# Patient Record
Sex: MALE | Race: ASIAN | NOT HISPANIC OR LATINO
[De-identification: names, ages, dates, MRNs, and addresses within clinical notes are randomized per-mention and may not be internally consistent; named-entity substitution may affect disease eponyms.]

---

## 2020-10-22 ENCOUNTER — APPOINTMENT (OUTPATIENT)
Dept: CARDIOLOGY | Facility: CLINIC | Age: 65
End: 2020-10-22
Payer: MEDICARE

## 2020-10-22 ENCOUNTER — NON-APPOINTMENT (OUTPATIENT)
Age: 65
End: 2020-10-22

## 2020-10-22 VITALS
SYSTOLIC BLOOD PRESSURE: 129 MMHG | TEMPERATURE: 97.9 F | RESPIRATION RATE: 17 BRPM | BODY MASS INDEX: 28 KG/M2 | DIASTOLIC BLOOD PRESSURE: 79 MMHG | OXYGEN SATURATION: 98 % | WEIGHT: 158 LBS | HEIGHT: 63 IN | HEART RATE: 69 BPM

## 2020-10-22 PROCEDURE — 99204 OFFICE O/P NEW MOD 45 MIN: CPT

## 2020-10-22 PROCEDURE — 93000 ELECTROCARDIOGRAM COMPLETE: CPT

## 2020-10-22 RX ORDER — PANTOPRAZOLE SODIUM 40 MG/1
40 GRANULE, DELAYED RELEASE ORAL
Refills: 0 | Status: ACTIVE | COMMUNITY
Start: 2020-10-22

## 2020-10-22 RX ORDER — SIMVASTATIN 10 MG/1
10 TABLET, FILM COATED ORAL
Refills: 0 | Status: ACTIVE | COMMUNITY
Start: 2020-10-22

## 2020-10-22 RX ORDER — MYCOPHENOLATE MOFETIL 250 MG/1
250 CAPSULE ORAL
Refills: 0 | Status: ACTIVE | COMMUNITY
Start: 2020-10-22

## 2020-10-22 RX ORDER — TACROLIMUS 0.5 MG/1
0.5 CAPSULE ORAL
Refills: 0 | Status: ACTIVE | COMMUNITY
Start: 2020-10-22

## 2020-10-22 RX ORDER — METOPROLOL TARTRATE 25 MG/1
25 TABLET, FILM COATED ORAL
Refills: 0 | Status: ACTIVE | COMMUNITY
Start: 2020-10-22

## 2020-10-22 RX ORDER — TENOFOVIR DISOPROXIL FUMARATE 300 MG/1
300 TABLET, COATED ORAL
Refills: 0 | Status: ACTIVE | COMMUNITY
Start: 2020-10-22

## 2020-10-22 NOTE — REVIEW OF SYSTEMS
[see HPI] : see HPI [Shortness Of Breath] : shortness of breath [Dyspnea on exertion] : dyspnea during exertion [Chest  Pressure] : no chest pressure [Chest Pain] : chest pain [Palpitations] : no palpitations [Negative] : Heme/Lymph

## 2020-10-22 NOTE — DISCUSSION/SUMMARY
[FreeTextEntry1] : 65 M HTN hyperlipidemia CAD PCI with SOB.\par CHECK ECHOCARDIOGRAM.\par CHECK NST TO ASSESS PERFUSION.\par Continue medications for HTN and hyperlipidemia.\par Symptom monitoring and social distancing.

## 2020-10-22 NOTE — REASON FOR VISIT
[Follow-Up - Clinic] : a clinic follow-up of [Coronary Artery Disease] : coronary artery disease [Hyperlipidemia] : hyperlipidemia [Hypertension] : hypertension [FreeTextEntry1] : 65 M HTN hyperlipidemia CAD s/p PCI 2014 of LAD and RCA with CP and SOB.\par

## 2020-10-22 NOTE — HISTORY OF PRESENT ILLNESS
[FreeTextEntry1] :  * PATIENT HAS NOT SEEN ME IN 4 YEARS*\par 1. CAD s/p PCI: He underwent cardiac cath at [facility] and was found to have both RCA and LAD stenosis in 6/2015. He underwent PCI and of his LAD.  \par 2. HTN: on medications, well-controlled. No SE noted.\par 3. Hyperlipidemia: on statin, no muscle pain. \par Patient has noted worsening SOB with exertion over the last 3 months. SOB is severe, limits his ET and is relieved with rest. He has noted that the symptoms last minutes and are relieved with rest and occur more frequently on a daily basis.

## 2020-10-22 NOTE — PHYSICAL EXAM
[General Appearance - Well Developed] : well developed [Normal Appearance] : normal appearance [Well Groomed] : well groomed [General Appearance - Well Nourished] : well nourished [No Deformities] : no deformities [General Appearance - In No Acute Distress] : no acute distress [Normal Conjunctiva] : the conjunctiva exhibited no abnormalities [Eyelids - No Xanthelasma] : the eyelids demonstrated no xanthelasmas [Normal Oral Mucosa] : normal oral mucosa [No Oral Pallor] : no oral pallor [No Oral Cyanosis] : no oral cyanosis [Normal Jugular Venous A Waves Present] : normal jugular venous A waves present [Normal Jugular Venous V Waves Present] : normal jugular venous V waves present [No Jugular Venous Jansen A Waves] : no jugular venous jansen A waves [Respiration, Rhythm And Depth] : normal respiratory rhythm and effort [Exaggerated Use Of Accessory Muscles For Inspiration] : no accessory muscle use [Auscultation Breath Sounds / Voice Sounds] : lungs were clear to auscultation bilaterally [Heart Rate And Rhythm] : heart rate and rhythm were normal [Heart Sounds] : normal S1 and S2 [Murmurs] : no murmurs present [Abdomen Soft] : soft [Abdomen Tenderness] : non-tender [Abdomen Mass (___ Cm)] : no abdominal mass palpated [Abnormal Walk] : normal gait [Gait - Sufficient For Exercise Testing] : the gait was sufficient for exercise testing [Nail Clubbing] : no clubbing of the fingernails [Cyanosis, Localized] : no localized cyanosis [Petechial Hemorrhages (___cm)] : no petechial hemorrhages [Skin Color & Pigmentation] : normal skin color and pigmentation [] : no rash [No Venous Stasis] : no venous stasis [Skin Lesions] : no skin lesions [No Skin Ulcers] : no skin ulcer [No Xanthoma] : no  xanthoma was observed [Oriented To Time, Place, And Person] : oriented to person, place, and time [Affect] : the affect was normal [Mood] : the mood was normal [No Anxiety] : not feeling anxious

## 2020-11-03 ENCOUNTER — APPOINTMENT (OUTPATIENT)
Dept: CARDIOLOGY | Facility: CLINIC | Age: 65
End: 2020-11-03
Payer: MEDICARE

## 2020-11-03 VITALS
SYSTOLIC BLOOD PRESSURE: 111 MMHG | WEIGHT: 158 LBS | TEMPERATURE: 97.2 F | OXYGEN SATURATION: 98 % | HEART RATE: 81 BPM | BODY MASS INDEX: 27.99 KG/M2 | DIASTOLIC BLOOD PRESSURE: 74 MMHG | RESPIRATION RATE: 18 BRPM

## 2020-11-03 DIAGNOSIS — R07.9 CHEST PAIN, UNSPECIFIED: ICD-10-CM

## 2020-11-03 DIAGNOSIS — R06.02 SHORTNESS OF BREATH: ICD-10-CM

## 2020-11-03 PROCEDURE — A9500: CPT

## 2020-11-03 PROCEDURE — 93306 TTE W/DOPPLER COMPLETE: CPT

## 2020-11-03 PROCEDURE — 93015 CV STRESS TEST SUPVJ I&R: CPT

## 2020-11-03 PROCEDURE — 78452 HT MUSCLE IMAGE SPECT MULT: CPT

## 2020-11-03 PROCEDURE — 99072 ADDL SUPL MATRL&STAF TM PHE: CPT

## 2020-11-09 ENCOUNTER — APPOINTMENT (OUTPATIENT)
Dept: CARDIOLOGY | Facility: CLINIC | Age: 65
End: 2020-11-09
Payer: MEDICARE

## 2020-11-09 VITALS
SYSTOLIC BLOOD PRESSURE: 124 MMHG | RESPIRATION RATE: 18 BRPM | DIASTOLIC BLOOD PRESSURE: 79 MMHG | HEART RATE: 60 BPM | TEMPERATURE: 98 F | WEIGHT: 158 LBS | BODY MASS INDEX: 27.99 KG/M2 | OXYGEN SATURATION: 98 %

## 2020-11-09 PROCEDURE — 99072 ADDL SUPL MATRL&STAF TM PHE: CPT

## 2020-11-09 PROCEDURE — 99214 OFFICE O/P EST MOD 30 MIN: CPT

## 2020-11-09 RX ORDER — NITROGLYCERIN 0.4 MG/1
0.4 TABLET SUBLINGUAL
Qty: 30 | Refills: 2 | Status: ACTIVE | COMMUNITY
Start: 2020-11-09 | End: 1900-01-01

## 2020-11-09 NOTE — REASON FOR VISIT
[Follow-Up - Clinic] : a clinic follow-up of [Chest Pain] : chest pain [Coronary Artery Disease] : coronary artery disease [Dyspnea] : dyspnea [Hyperlipidemia] : hyperlipidemia [Hypertension] : hypertension [FreeTextEntry1] : 65 M HTN hyperlipidemia CAD s/p PCI 2014 of LAD and RCA with CP and SOB.\par

## 2020-11-09 NOTE — HISTORY OF PRESENT ILLNESS
[FreeTextEntry1] :  \par 1. CAD s/p PCI: He underwent cardiac cath at [facility] and was found to have both RCA and LAD stenosis in 6/2015. He underwent PCI and of his LAD. \par 2. HTN: on medications, well-controlled. No SE noted.\par 3. Hyperlipidemia: on statin, no muscle pain. \par He has had worsening CP and SOB with exertion over the last several months. He underwent an echo and NST. Echo showed normal LVEF. NST shows mild to moderate ischemia in anterior and apical walls. He continues to have CP.\par

## 2020-11-09 NOTE — DISCUSSION/SUMMARY
[FreeTextEntry1] : 65 M HTN hyperlipidemia CAD s/p PCI with CP and abnormal NST.\par REFER FOR CARDIAC CATHETERIZATION STAT.\par Continue medications for HTN.\par Continue statin.\par NG prn for CP given.

## 2020-11-17 ENCOUNTER — APPOINTMENT (OUTPATIENT)
Dept: DISASTER EMERGENCY | Facility: CLINIC | Age: 65
End: 2020-11-17

## 2020-11-17 DIAGNOSIS — Z01.818 ENCOUNTER FOR OTHER PREPROCEDURAL EXAMINATION: ICD-10-CM

## 2020-11-18 VITALS
HEART RATE: 51 BPM | TEMPERATURE: 97 F | OXYGEN SATURATION: 100 % | DIASTOLIC BLOOD PRESSURE: 70 MMHG | SYSTOLIC BLOOD PRESSURE: 147 MMHG | RESPIRATION RATE: 16 BRPM

## 2020-11-18 LAB — SARS-COV-2 N GENE NPH QL NAA+PROBE: NOT DETECTED

## 2020-11-18 RX ORDER — ASCORBIC ACID 60 MG
1 TABLET,CHEWABLE ORAL
Qty: 0 | Refills: 0 | DISCHARGE

## 2020-11-18 RX ORDER — CHLORHEXIDINE GLUCONATE 213 G/1000ML
1 SOLUTION TOPICAL ONCE
Refills: 0 | Status: DISCONTINUED | OUTPATIENT
Start: 2020-11-20 | End: 2020-11-20

## 2020-11-18 NOTE — H&P ADULT - ATTENDING COMMENTS
PLease see housestaff note for full details. I have reviewed the case, examined the patient and agree with plan.  For cardiac cath today.

## 2020-11-18 NOTE — H&P ADULT - ASSESSMENT
64 y/o male with PMH of HTN, DM, HLD, CAD (s/p DORA to RCA, LAD), Hepatitis B/cirrhosis with Liver Ca (s/p Liver transplant 1/2018, s/p hepatic artery stent, ?chemotherapy) who presents to [facility] for recommended cardiac catheterization with possible intervention if clinically indicated, in light of pts risk factors, CCS class IV anginal symptoms and abnormal NST.    -ASA 3, Mallampati 3  -Pt compliant with home ASA 81mg, last dose this AM. Will load pt with Plavix 600mg prior to cath  -IVF Hydration NS @ 75cc/hr  -pre cath consented via [facility]  #[id number]    Risks & benefits of procedure and alternative therapy have been explained to the patient including but not limited to: allergic reaction, bleeding w/possible need for blood transfusion, infection, renal and vascular compromise, limb damage, arrhythmia, stroke, vessel dissection/perforation, Myocardial infarction, emergent CABG. Informed consent obtained and in chart.

## 2020-11-18 NOTE — H&P ADULT - NSICDXPASTMEDICALHX_GEN_ALL_CORE_FT
PAST MEDICAL HISTORY:  Atherosclerosis of coronary artery CAD (coronary artery disease)    Essential hypertension HTN (hypertension)    Hepatic cirrhosis due to hepatitis B     Hepatocellular carcinoma s/p Liver tansplant in 2018    Hyperlipidemia Hyperlipidemia

## 2020-11-18 NOTE — H&P ADULT - HISTORY OF PRESENT ILLNESS
COVID Negative in HIE  Pharmacy: [facility] [address] [place]   Cardiologist: Dr. Corado   Escort: need transport     Hx obtained via [facility]  # [id number]  66 y/o male with PMH of HTN, DM, HLD, CAD (s/p DORA to RCA, LAD), Hepatitis B/cirrhosis with Liver Ca (s/p Liver transplant 1/2018 ?chemotherapy) who presented to cardiologist Dr. Corado endorsing worsening shortness of breath with exertion of < 1 block that started 1 year ago  shortness of breath resolves with 5 minutes, denies shortness of breath with rest. Patient denies active chest pain, palpitations, diaphoresis, N/V fatigue, dizziness, syncope, orthopnea, positional nocturnal dyspnea, recent fever, and chills,   patient endorses Le edema with sitting for extended periods of time.  NST on 11/3/20 small mild defects in apical, distal anterior, distal inferior, distal lateral distal septal wall that are reversible suggestive of mild-moderate od distalLAD ischemia EF 68% Echo 11/3/20 minimal MR, Normal LV dimensions and wall thickness, grossly normal LV systolic function EF 65-70%, minimal TR, borderline pulmHTN PASP 37mmHg.    In light of patients risk factors, CCS class IV anginal equivalent symptoms, known CAD and abnormal NST the patient is now refferred for cardiac catheterization with possible intervention.     Cath history:  5/20/2015 @ [facility], DORA to proxRCA (90%) with aneurysm beyond the lesion LM normal, midLAD 75%, distalLCx prior stent patent ED 55%  6/26/2015 @ [facility] DORA to proxLAD (80%), midLCx 40%, LM normal, proxRCA stent patent COVID Negative in HIE  Ph[facility]   Cardiologist: Dr. Corado     Hx obtained via [facility]  # [id number]  66 y/o male with PMH of HTN, DM, HLD, CAD (s/p DORA to RCA, LAD), Hepatitis B/cirrhosis with Liver Ca (s/p Liver transplant 1/2018, s/p hepatic artery stent, ?chemotherapy) who presented to cardiologist Dr. Corado endorsing worsening shortness of breath with exertion of < 1 block that started 1 year ago  shortness of breath resolves with 5 minutes, denies shortness of breath with rest. Patient denies active chest pain, palpitations, diaphoresis, N/V fatigue, dizziness, syncope, orthopnea, positional nocturnal dyspnea, recent fever, and chills,   patient endorses Le edema with sitting for extended periods of time.  NST on 11/3/20 small mild defects in apical, distal anterior, distal inferior, distal lateral distal septal wall that are reversible suggestive of mild-moderate od distalLAD ischemia EF 68% Echo 11/3/20 minimal MR, Normal LV dimensions and wall thickness, grossly normal LV systolic function EF 65-70%, minimal TR, borderline pulmHTN PASP 37mmHg.    In light of patients risk factors, CCS class IV anginal equivalent symptoms, known CAD and abnormal NST the patient is now refferred for cardiac catheterization with possible intervention.     Cath history:  5/20/2015 @ [facility], DORA to proxRCA (90%) with aneurysm beyond the lesion LM normal, midLAD 75%, distalLCx prior stent patent ED 55%  6/26/2015 @ [facility] DORA to proxLAD (80%), midLCx 40%, LM normal, proxRCA stent patent

## 2020-11-20 ENCOUNTER — INPATIENT (INPATIENT)
Facility: HOSPITAL | Age: 65
LOS: 0 days | Discharge: HOME | DRG: 251 | End: 2020-11-21
Attending: INTERNAL MEDICINE | Admitting: INTERNAL MEDICINE
Payer: MEDICARE

## 2020-11-20 ENCOUNTER — TRANSCRIPTION ENCOUNTER (OUTPATIENT)
Age: 65
End: 2020-11-20

## 2020-11-20 DIAGNOSIS — Z94.4 LIVER TRANSPLANT STATUS: Chronic | ICD-10-CM

## 2020-11-20 LAB
A1C WITH ESTIMATED AVERAGE GLUCOSE RESULT: 6.8 % — HIGH (ref 4–5.6)
ALBUMIN SERPL ELPH-MCNC: 4.9 G/DL — SIGNIFICANT CHANGE UP (ref 3.3–5)
ALP SERPL-CCNC: 70 U/L — SIGNIFICANT CHANGE UP (ref 40–120)
ALT FLD-CCNC: 28 U/L — SIGNIFICANT CHANGE UP (ref 10–45)
ANION GAP SERPL CALC-SCNC: 11 MMOL/L — SIGNIFICANT CHANGE UP (ref 5–17)
APTT BLD: 35.9 SEC — HIGH (ref 27.5–35.5)
AST SERPL-CCNC: 23 U/L — SIGNIFICANT CHANGE UP (ref 10–40)
BASOPHILS # BLD AUTO: 0.02 K/UL — SIGNIFICANT CHANGE UP (ref 0–0.2)
BASOPHILS NFR BLD AUTO: 0.4 % — SIGNIFICANT CHANGE UP (ref 0–2)
BILIRUB SERPL-MCNC: 0.6 MG/DL — SIGNIFICANT CHANGE UP (ref 0.2–1.2)
BUN SERPL-MCNC: 16 MG/DL — SIGNIFICANT CHANGE UP (ref 7–23)
CALCIUM SERPL-MCNC: 9.8 MG/DL — SIGNIFICANT CHANGE UP (ref 8.4–10.5)
CHLORIDE SERPL-SCNC: 98 MMOL/L — SIGNIFICANT CHANGE UP (ref 96–108)
CHOLEST SERPL-MCNC: 114 MG/DL — SIGNIFICANT CHANGE UP
CK MB CFR SERPL CALC: 1.4 NG/ML — SIGNIFICANT CHANGE UP (ref 0–6.7)
CK SERPL-CCNC: 118 U/L — SIGNIFICANT CHANGE UP (ref 30–200)
CO2 SERPL-SCNC: 28 MMOL/L — SIGNIFICANT CHANGE UP (ref 22–31)
CREAT SERPL-MCNC: 1.15 MG/DL — SIGNIFICANT CHANGE UP (ref 0.5–1.3)
EOSINOPHIL # BLD AUTO: 0.05 K/UL — SIGNIFICANT CHANGE UP (ref 0–0.5)
EOSINOPHIL NFR BLD AUTO: 0.9 % — SIGNIFICANT CHANGE UP (ref 0–6)
ESTIMATED AVERAGE GLUCOSE: 148 MG/DL — HIGH (ref 68–114)
GLUCOSE BLDC GLUCOMTR-MCNC: 115 MG/DL — HIGH (ref 70–99)
GLUCOSE SERPL-MCNC: 109 MG/DL — HIGH (ref 70–99)
HCT VFR BLD CALC: 40.2 % — SIGNIFICANT CHANGE UP (ref 39–50)
HDLC SERPL-MCNC: 22 MG/DL — LOW
HGB BLD-MCNC: 12.3 G/DL — LOW (ref 13–17)
IMM GRANULOCYTES NFR BLD AUTO: 0.2 % — SIGNIFICANT CHANGE UP (ref 0–1.5)
INR BLD: 1.19 — HIGH (ref 0.88–1.16)
LIPID PNL WITH DIRECT LDL SERPL: 44 MG/DL — SIGNIFICANT CHANGE UP
LYMPHOCYTES # BLD AUTO: 0.99 K/UL — LOW (ref 1–3.3)
LYMPHOCYTES # BLD AUTO: 18.3 % — SIGNIFICANT CHANGE UP (ref 13–44)
MCHC RBC-ENTMCNC: 25.1 PG — LOW (ref 27–34)
MCHC RBC-ENTMCNC: 30.6 GM/DL — LOW (ref 32–36)
MCV RBC AUTO: 82 FL — SIGNIFICANT CHANGE UP (ref 80–100)
MONOCYTES # BLD AUTO: 0.55 K/UL — SIGNIFICANT CHANGE UP (ref 0–0.9)
MONOCYTES NFR BLD AUTO: 10.1 % — SIGNIFICANT CHANGE UP (ref 2–14)
NEUTROPHILS # BLD AUTO: 3.8 K/UL — SIGNIFICANT CHANGE UP (ref 1.8–7.4)
NEUTROPHILS NFR BLD AUTO: 70.1 % — SIGNIFICANT CHANGE UP (ref 43–77)
NON HDL CHOLESTEROL: 92 MG/DL — SIGNIFICANT CHANGE UP
NRBC # BLD: 0 /100 WBCS — SIGNIFICANT CHANGE UP (ref 0–0)
PLATELET # BLD AUTO: 159 K/UL — SIGNIFICANT CHANGE UP (ref 150–400)
POTASSIUM SERPL-MCNC: 4.3 MMOL/L — SIGNIFICANT CHANGE UP (ref 3.5–5.3)
POTASSIUM SERPL-SCNC: 4.3 MMOL/L — SIGNIFICANT CHANGE UP (ref 3.5–5.3)
PROT SERPL-MCNC: 7.6 G/DL — SIGNIFICANT CHANGE UP (ref 6–8.3)
PROTHROM AB SERPL-ACNC: 14.2 SEC — HIGH (ref 10.6–13.6)
RBC # BLD: 4.9 M/UL — SIGNIFICANT CHANGE UP (ref 4.2–5.8)
RBC # FLD: 16.2 % — HIGH (ref 10.3–14.5)
SODIUM SERPL-SCNC: 137 MMOL/L — SIGNIFICANT CHANGE UP (ref 135–145)
TRIGL SERPL-MCNC: 241 MG/DL — HIGH
WBC # BLD: 5.42 K/UL — SIGNIFICANT CHANGE UP (ref 3.8–10.5)
WBC # FLD AUTO: 5.42 K/UL — SIGNIFICANT CHANGE UP (ref 3.8–10.5)

## 2020-11-20 PROCEDURE — 93454 CORONARY ARTERY ANGIO S&I: CPT | Mod: 26,59

## 2020-11-20 PROCEDURE — 92920 PRQ TRLUML C ANGIOP 1ART&/BR: CPT | Mod: LD

## 2020-11-20 PROCEDURE — 99222 1ST HOSP IP/OBS MODERATE 55: CPT

## 2020-11-20 RX ORDER — MYCOPHENOLATE MOFETIL 250 MG/1
250 CAPSULE ORAL
Refills: 0 | Status: DISCONTINUED | OUTPATIENT
Start: 2020-11-20 | End: 2020-11-21

## 2020-11-20 RX ORDER — SODIUM CHLORIDE 9 MG/ML
500 INJECTION INTRAMUSCULAR; INTRAVENOUS; SUBCUTANEOUS
Refills: 0 | Status: DISCONTINUED | OUTPATIENT
Start: 2020-11-20 | End: 2020-11-20

## 2020-11-20 RX ORDER — SIMVASTATIN 20 MG/1
1 TABLET, FILM COATED ORAL
Qty: 0 | Refills: 0 | DISCHARGE

## 2020-11-20 RX ORDER — SODIUM CHLORIDE 9 MG/ML
500 INJECTION INTRAMUSCULAR; INTRAVENOUS; SUBCUTANEOUS
Refills: 0 | Status: DISCONTINUED | OUTPATIENT
Start: 2020-11-20 | End: 2020-11-21

## 2020-11-20 RX ORDER — CHOLECALCIFEROL (VITAMIN D3) 125 MCG
1 CAPSULE ORAL
Qty: 0 | Refills: 0 | DISCHARGE

## 2020-11-20 RX ORDER — SPIRONOLACTONE 25 MG/1
1 TABLET, FILM COATED ORAL
Qty: 0 | Refills: 0 | DISCHARGE

## 2020-11-20 RX ORDER — MULTIVIT-MIN/FERROUS GLUCONATE 9 MG/15 ML
1 LIQUID (ML) ORAL
Qty: 0 | Refills: 0 | DISCHARGE

## 2020-11-20 RX ORDER — CLOPIDOGREL BISULFATE 75 MG/1
75 TABLET, FILM COATED ORAL DAILY
Refills: 0 | Status: DISCONTINUED | OUTPATIENT
Start: 2020-11-21 | End: 2020-11-21

## 2020-11-20 RX ORDER — PANTOPRAZOLE SODIUM 20 MG/1
1 TABLET, DELAYED RELEASE ORAL
Qty: 0 | Refills: 0 | DISCHARGE

## 2020-11-20 RX ORDER — METOPROLOL TARTRATE 50 MG
1 TABLET ORAL
Qty: 0 | Refills: 0 | DISCHARGE

## 2020-11-20 RX ORDER — CLOPIDOGREL BISULFATE 75 MG/1
600 TABLET, FILM COATED ORAL ONCE
Refills: 0 | Status: COMPLETED | OUTPATIENT
Start: 2020-11-20 | End: 2020-11-20

## 2020-11-20 RX ORDER — METOPROLOL TARTRATE 50 MG
25 TABLET ORAL
Refills: 0 | Status: DISCONTINUED | OUTPATIENT
Start: 2020-11-20 | End: 2020-11-21

## 2020-11-20 RX ORDER — TENOFOVIR DISOPROXIL FUMARATE 300 MG/1
1 TABLET, FILM COATED ORAL
Qty: 0 | Refills: 0 | DISCHARGE

## 2020-11-20 RX ORDER — ICOSAPENT ETHYL 500 MG/1
2 CAPSULE, LIQUID FILLED ORAL
Qty: 0 | Refills: 0 | DISCHARGE

## 2020-11-20 RX ORDER — PANTOPRAZOLE SODIUM 20 MG/1
40 TABLET, DELAYED RELEASE ORAL
Refills: 0 | Status: DISCONTINUED | OUTPATIENT
Start: 2020-11-20 | End: 2020-11-21

## 2020-11-20 RX ORDER — MYCOPHENOLATE MOFETIL 250 MG/1
1 CAPSULE ORAL
Qty: 0 | Refills: 0 | DISCHARGE

## 2020-11-20 RX ORDER — TACROLIMUS 5 MG/1
0.5 CAPSULE ORAL EVERY 12 HOURS
Refills: 0 | Status: DISCONTINUED | OUTPATIENT
Start: 2020-11-20 | End: 2020-11-21

## 2020-11-20 RX ORDER — ASCORBIC ACID 60 MG
1 TABLET,CHEWABLE ORAL
Qty: 0 | Refills: 0 | DISCHARGE

## 2020-11-20 RX ORDER — SIMVASTATIN 20 MG/1
10 TABLET, FILM COATED ORAL AT BEDTIME
Refills: 0 | Status: DISCONTINUED | OUTPATIENT
Start: 2020-11-20 | End: 2020-11-21

## 2020-11-20 RX ORDER — CLOPIDOGREL BISULFATE 75 MG/1
1 TABLET, FILM COATED ORAL
Qty: 0 | Refills: 0 | DISCHARGE

## 2020-11-20 RX ORDER — MYCOPHENOLATE MOFETIL 250 MG/1
4 CAPSULE ORAL
Qty: 0 | Refills: 0 | DISCHARGE

## 2020-11-20 RX ORDER — TACROLIMUS 5 MG/1
1 CAPSULE ORAL
Qty: 0 | Refills: 0 | DISCHARGE

## 2020-11-20 RX ORDER — ASPIRIN/CALCIUM CARB/MAGNESIUM 324 MG
81 TABLET ORAL DAILY
Refills: 0 | Status: DISCONTINUED | OUTPATIENT
Start: 2020-11-21 | End: 2020-11-21

## 2020-11-20 RX ORDER — TENOFOVIR DISOPROXIL FUMARATE 300 MG/1
300 TABLET, FILM COATED ORAL DAILY
Refills: 0 | Status: DISCONTINUED | OUTPATIENT
Start: 2020-11-21 | End: 2020-11-21

## 2020-11-20 RX ORDER — NADOLOL 80 MG/1
2 TABLET ORAL
Qty: 0 | Refills: 0 | DISCHARGE

## 2020-11-20 RX ORDER — UBIDECARENONE 100 MG
1 CAPSULE ORAL
Qty: 0 | Refills: 0 | DISCHARGE

## 2020-11-20 RX ADMIN — MYCOPHENOLATE MOFETIL 250 MILLIGRAM(S): 250 CAPSULE ORAL at 22:14

## 2020-11-20 RX ADMIN — CLOPIDOGREL BISULFATE 600 MILLIGRAM(S): 75 TABLET, FILM COATED ORAL at 12:27

## 2020-11-20 RX ADMIN — SODIUM CHLORIDE 75 MILLILITER(S): 9 INJECTION INTRAMUSCULAR; INTRAVENOUS; SUBCUTANEOUS at 12:27

## 2020-11-20 RX ADMIN — Medication 25 MILLIGRAM(S): at 22:14

## 2020-11-20 RX ADMIN — SIMVASTATIN 10 MILLIGRAM(S): 20 TABLET, FILM COATED ORAL at 22:14

## 2020-11-20 RX ADMIN — TACROLIMUS 0.5 MILLIGRAM(S): 5 CAPSULE ORAL at 22:14

## 2020-11-20 NOTE — CHART NOTE - NSCHARTNOTEFT_GEN_A_CORE
Indication for Catheterization: Unstable Angina. (+) Nuclear Stress Test  Indication for Admission: PCI of Unprotected Proximal LAD.    One Coronary Artery Disease  Syntax score low  Frailty score 4    Coronary Angiogram  LMCA: Normal  LAD: 60% instent restenosis of proxLAD  D1: Mild luminal irregularities  LCx: 30-50% stenosis of midLCx  OM1: Mild luminal irregularities  RCA: Large, dominant vessel with mild luminal irregularities    Left Heart Catheterization deferred    Intervention  - Successful PTCA of 60% stenosis of pLAD with a 3.5 x 15 mm Angiosculpt Balloon . 0% residual stenosis and excellent angiographic result with IZZY 3 flow post intervention.      Radial band placed on Right Radial access site with adequate hemostasis prior to leaving the cath lab  No procedural complications    Recommendations  Dual anti-platelet therapy with Aspirin 81mg daily and Plavix 75mg daily for at least 1 year after which Aspirin 81mg daily should be continued indefinitely  Optimal medical therapy as tolerated, including appropriate intensity statin, beta blocker and ACE/ARB if indicated  Risk factor modification and risk reduction strategies with lifestyle changes  Follow up with Dr Corado post discharge

## 2020-11-21 ENCOUNTER — TRANSCRIPTION ENCOUNTER (OUTPATIENT)
Age: 65
End: 2020-11-21

## 2020-11-21 VITALS — TEMPERATURE: 98 F

## 2020-11-21 LAB
ANION GAP SERPL CALC-SCNC: 12 MMOL/L — SIGNIFICANT CHANGE UP (ref 5–17)
BUN SERPL-MCNC: 16 MG/DL — SIGNIFICANT CHANGE UP (ref 7–23)
CALCIUM SERPL-MCNC: 8.9 MG/DL — SIGNIFICANT CHANGE UP (ref 8.4–10.5)
CHLORIDE SERPL-SCNC: 103 MMOL/L — SIGNIFICANT CHANGE UP (ref 96–108)
CO2 SERPL-SCNC: 23 MMOL/L — SIGNIFICANT CHANGE UP (ref 22–31)
CREAT SERPL-MCNC: 1.23 MG/DL — SIGNIFICANT CHANGE UP (ref 0.5–1.3)
GLUCOSE SERPL-MCNC: 142 MG/DL — HIGH (ref 70–99)
HCT VFR BLD CALC: 39 % — SIGNIFICANT CHANGE UP (ref 39–50)
HCV AB S/CO SERPL IA: 0.09 S/CO — SIGNIFICANT CHANGE UP
HCV AB SERPL-IMP: SIGNIFICANT CHANGE UP
HGB BLD-MCNC: 12.1 G/DL — LOW (ref 13–17)
MAGNESIUM SERPL-MCNC: 2.1 MG/DL — SIGNIFICANT CHANGE UP (ref 1.6–2.6)
MCHC RBC-ENTMCNC: 25.4 PG — LOW (ref 27–34)
MCHC RBC-ENTMCNC: 31 GM/DL — LOW (ref 32–36)
MCV RBC AUTO: 81.9 FL — SIGNIFICANT CHANGE UP (ref 80–100)
NRBC # BLD: 0 /100 WBCS — SIGNIFICANT CHANGE UP (ref 0–0)
PLATELET # BLD AUTO: 155 K/UL — SIGNIFICANT CHANGE UP (ref 150–400)
POTASSIUM SERPL-MCNC: 4.4 MMOL/L — SIGNIFICANT CHANGE UP (ref 3.5–5.3)
POTASSIUM SERPL-SCNC: 4.4 MMOL/L — SIGNIFICANT CHANGE UP (ref 3.5–5.3)
RBC # BLD: 4.76 M/UL — SIGNIFICANT CHANGE UP (ref 4.2–5.8)
RBC # FLD: 16.3 % — HIGH (ref 10.3–14.5)
SODIUM SERPL-SCNC: 138 MMOL/L — SIGNIFICANT CHANGE UP (ref 135–145)
WBC # BLD: 5.87 K/UL — SIGNIFICANT CHANGE UP (ref 3.8–10.5)
WBC # FLD AUTO: 5.87 K/UL — SIGNIFICANT CHANGE UP (ref 3.8–10.5)

## 2020-11-21 PROCEDURE — 82550 ASSAY OF CK (CPK): CPT

## 2020-11-21 PROCEDURE — 83735 ASSAY OF MAGNESIUM: CPT

## 2020-11-21 PROCEDURE — 99239 HOSP IP/OBS DSCHRG MGMT >30: CPT

## 2020-11-21 PROCEDURE — 80061 LIPID PANEL: CPT

## 2020-11-21 PROCEDURE — C1725: CPT

## 2020-11-21 PROCEDURE — 80053 COMPREHEN METABOLIC PANEL: CPT

## 2020-11-21 PROCEDURE — 85730 THROMBOPLASTIN TIME PARTIAL: CPT

## 2020-11-21 PROCEDURE — 36415 COLL VENOUS BLD VENIPUNCTURE: CPT

## 2020-11-21 PROCEDURE — 83036 HEMOGLOBIN GLYCOSYLATED A1C: CPT

## 2020-11-21 PROCEDURE — 85025 COMPLETE CBC W/AUTO DIFF WBC: CPT

## 2020-11-21 PROCEDURE — 80048 BASIC METABOLIC PNL TOTAL CA: CPT

## 2020-11-21 PROCEDURE — 86803 HEPATITIS C AB TEST: CPT

## 2020-11-21 PROCEDURE — 85027 COMPLETE CBC AUTOMATED: CPT

## 2020-11-21 PROCEDURE — 85610 PROTHROMBIN TIME: CPT

## 2020-11-21 PROCEDURE — 82553 CREATINE MB FRACTION: CPT

## 2020-11-21 PROCEDURE — 82962 GLUCOSE BLOOD TEST: CPT

## 2020-11-21 PROCEDURE — C1887: CPT

## 2020-11-21 PROCEDURE — C1769: CPT

## 2020-11-21 RX ORDER — ASPIRIN/CALCIUM CARB/MAGNESIUM 324 MG
1 TABLET ORAL
Qty: 0 | Refills: 0 | DISCHARGE

## 2020-11-21 RX ORDER — CLOPIDOGREL BISULFATE 75 MG/1
1 TABLET, FILM COATED ORAL
Qty: 30 | Refills: 4
Start: 2020-11-21 | End: 2021-04-19

## 2020-11-21 RX ORDER — ASPIRIN/CALCIUM CARB/MAGNESIUM 324 MG
1 TABLET ORAL
Qty: 30 | Refills: 3
Start: 2020-11-21 | End: 2021-03-20

## 2020-11-21 RX ADMIN — CLOPIDOGREL BISULFATE 75 MILLIGRAM(S): 75 TABLET, FILM COATED ORAL at 11:30

## 2020-11-21 RX ADMIN — MYCOPHENOLATE MOFETIL 250 MILLIGRAM(S): 250 CAPSULE ORAL at 11:29

## 2020-11-21 RX ADMIN — TACROLIMUS 0.5 MILLIGRAM(S): 5 CAPSULE ORAL at 11:30

## 2020-11-21 RX ADMIN — Medication 81 MILLIGRAM(S): at 11:30

## 2020-11-21 RX ADMIN — PANTOPRAZOLE SODIUM 40 MILLIGRAM(S): 20 TABLET, DELAYED RELEASE ORAL at 06:17

## 2020-11-21 RX ADMIN — TENOFOVIR DISOPROXIL FUMARATE 300 MILLIGRAM(S): 300 TABLET, FILM COATED ORAL at 11:29

## 2020-11-21 NOTE — DISCHARGE NOTE PROVIDER - NSDCFUADDAPPT_GEN_ALL_CORE_FT
1. You are to follow up with Dr. Corado on 11/30/2020. Please call his office for the time of your scheduled appointment for that day.

## 2020-11-21 NOTE — DISCHARGE NOTE PROVIDER - NSDCCPCAREPLAN_GEN_ALL_CORE_FT
PRINCIPAL DISCHARGE DIAGNOSIS  Diagnosis: CAD (coronary artery disease)  Assessment and Plan of Treatment: You had successful percutaneous coronary intervention with balloon angioplasty of proximal left anterior descending artery in-stent-restenosis.   --Continue Aspirin 81mg by mouth daily and Plavix 75mg by mouth daily.   --DO NOT STOP TAKING ASPIRIN OR PLAVIX UNLESS INSTRUCTED BY YOUR CARDIOLOGIST, TO PREVENT STENT CLOSURE/HEART ATTACK.   ***The catheter from your wrist was removed and you should remove the dressing in 24 hours.  *** You may shower once the dressing is removed, but avoid baths, hot tubs, or swimming for 5 days to prevent infection. If you notice bleeding from the site, hardening and pain at the site, drainage or redness from the site, coolness/paleness of the extremity, swelling, or fever, please call [phone].  *** All of your needed prescriptions have been sent electronically to your pharmacy.        SECONDARY DISCHARGE DIAGNOSES  Diagnosis: Hypertension  Assessment and Plan of Treatment: Continue Metoprolol Tartrate 25mg by mouth twice daily    Diagnosis: Hyperlipidemia  Assessment and Plan of Treatment: Continue Simvastatin 10mg by mouth at bedtime and Vascepa 1gram 2 capsules by mouth twice daily    Diagnosis: S/P liver transplant  Assessment and Plan of Treatment: Continue home regimen of Tacrolimus 0.5mg by mouth every 12hours, CellCept 250 mg by mouth twice daily    Diagnosis: Hepatitis B  Assessment and Plan of Treatment: Continue Tenofovir disoproxil fumarate 300 mg by mouth daily

## 2020-11-21 NOTE — DISCHARGE NOTE PROVIDER - CARE PROVIDER_API CALL
Bryce Corado)  Cardiovascular Disease; Internal Medicine  [address] [address]  Phone: [phone]  Fax: [phone]  Follow Up Time: 2 weeks   Bryce Corado)  Cardiovascular Disease; Internal Medicine  [address] [place] [address]  Phone: [phone]  Fax: [phone]  Scheduled Appointment: 11/30/2020

## 2020-11-21 NOTE — DISCHARGE NOTE PROVIDER - PROVIDER TOKENS
PROVIDER:[TOKEN:[1000:MIIS:1000],FOLLOWUP:[2 weeks]] PROVIDER:[TOKEN:[1000:MIIS:1000],SCHEDULEDAPPT:[11/30/2020]]

## 2020-11-21 NOTE — DISCHARGE NOTE PROVIDER - NSDCMRMEDTOKEN_GEN_ALL_CORE_FT
Aspirin Enteric Coated 81 mg oral delayed release tablet: 1 tab(s) orally once a day  CellCept 250 mg oral capsule: 1 cap(s) orally 2 times a day  Centrum Silver Plus Vision oral tablet: 1 tab(s) orally once a day  CoQ10 300 mg oral capsule: 1 cap(s) orally once a day  Metoprolol Tartrate 25 mg oral tablet: 1 tab(s) orally 2 times a day  Oyster Shell Calcium with Vitamin D 500 mg-200 intl units (5 mcg) oral tablet: 1 tab(s) orally 2 times a day  PreserVision oral tablet: 1 tab(s) orally once a day  Protonix 40 mg oral delayed release tablet: 1 tab(s) orally once a day  simvastatin 10 mg oral tablet: 1 tab(s) orally once a day (at bedtime)  tacrolimus 0.5 mg oral capsule: 1 cap(s) orally every 12 hours  tenofovir disoproxil fumarate 300 mg oral tablet: 1 tab(s) orally once a day  Vascepa 1 g oral capsule: 2 cap(s) orally 2 times a day  Vitamin B-100 oral tablet: 1 tab(s) orally once a day  Vitamin C 500 mg oral capsule: 1 cap(s) orally once a day  Vitamin D3: 1 tab(s) orally once a day   Aspirin Enteric Coated 81 mg oral delayed release tablet: 1 tab(s) orally once a day  CellCept 250 mg oral capsule: 1 cap(s) orally 2 times a day  Centrum Silver Plus Vision oral tablet: 1 tab(s) orally once a day  clopidogrel 75 mg oral tablet: 1 tab(s) orally once a day  CoQ10 300 mg oral capsule: 1 cap(s) orally once a day  Metoprolol Tartrate 25 mg oral tablet: 1 tab(s) orally 2 times a day  Oyster Shell Calcium with Vitamin D 500 mg-200 intl units (5 mcg) oral tablet: 1 tab(s) orally 2 times a day  PreserVision oral tablet: 1 tab(s) orally once a day  Protonix 40 mg oral delayed release tablet: 1 tab(s) orally once a day  simvastatin 10 mg oral tablet: 1 tab(s) orally once a day (at bedtime)  tacrolimus 0.5 mg oral capsule: 1 cap(s) orally every 12 hours  tenofovir disoproxil fumarate 300 mg oral tablet: 1 tab(s) orally once a day  Vascepa 1 g oral capsule: 2 cap(s) orally 2 times a day  Vitamin B-100 oral tablet: 1 tab(s) orally once a day  Vitamin C 500 mg oral capsule: 1 cap(s) orally once a day  Vitamin D3: 1 tab(s) orally once a day

## 2020-11-21 NOTE — DISCHARGE NOTE PROVIDER - HOSPITAL COURSE
65 yr old M with PMH of HTN, diet controlled DM, HLD, CAD (s/p DORA to RCA, LAD), Hepatitis B/cirrhosis with Liver Ca (s/p Liver transplant 1/2018, s/p hepatic artery stent, ?chemotherapy) who presented to cardiologist Dr. Corado endorsing worsening shortness of breath with exertion of < 1 block that started 1 year ago  shortness of breath resolves with 5 minutes, denies shortness of breath with rest. NST on 11/3/20 small mild defects in apical, distal anterior, distal inferior, distal lateral distal septal wall that are reversible suggestive of mild-moderate distalLAD ischemia EF 68%. Echo 11/3/20 minimal MR, Normal LV dimensions and wall thickness, grossly normal LV systolic function EF 65-70%, minimal TR, borderline pulmHTN PASP 37mmHg.      Patient subsequently referred to [facility] 11/20/20 s/p cardiac catheterization with successful PTCA to pLAD ISR, residual mLCX 30-50% lesion.     Patient admitted to [facility] for monitoring post procedure.   Patient currently asymptomatic, VSS, right radial access soft, NT, no hematoma, radial pulse 2+. Labs and telemetry reviewed.   Patient now deemed stable for discharge as per Dr. Valdez and to follow-up with Dr. Corado in 1-2 weeks.    65 yr old M with PMH of HTN, diet controlled DM, HLD, CAD (s/p DORA to RCA, LAD), Hepatitis B/cirrhosis with Liver Ca (s/p Liver transplant 1/2018, s/p hepatic artery stent, ?chemotherapy) who presented to cardiologist Dr. Corado endorsing worsening shortness of breath with exertion of < 1 block that started 1 year ago  shortness of breath resolves with 5 minutes, denies shortness of breath with rest. NST on 11/3/20 small mild defects in apical, distal anterior, distal inferior, distal lateral distal septal wall that are reversible suggestive of mild-moderate distalLAD ischemia EF 68%. Echo 11/3/20 minimal MR, Normal LV dimensions and wall thickness, grossly normal LV systolic function EF 65-70%, minimal TR, borderline pulmHTN PASP 37mmHg.    Patient subsequently referred to [facility] 11/20/20 s/p cardiac catheterization with successful PTCA to pLAD ISR, residual mLCX 30-50% lesion.     Patient admitted to [facility] for monitoring post procedure.   Patient currently asymptomatic, VSS, right radial access soft, NT, no hematoma, radial pulse 2+. Labs and telemetry reviewed.   Patient now deemed stable for discharge as per Dr. Valdez and to follow-up with Dr. Corado on 11/30/2020.    65 yr old M with PMH of HTN, diet controlled DM, HLD, CAD (s/p DORA to RCA, LAD), Hepatitis B/cirrhosis with Liver Ca (s/p Liver transplant 1/2018, s/p hepatic artery stent, ?chemotherapy) who presented to cardiologist Dr. Corado endorsing worsening shortness of breath with exertion of < 1 block that started 1 year ago  shortness of breath resolves with 5 minutes, denies shortness of breath with rest. NST on 11/3/20 small mild defects in apical, distal anterior, distal inferior, distal lateral distal septal wall that are reversible suggestive of mild-moderate distalLAD ischemia EF 68%. Echo 11/3/20 minimal MR, Normal LV dimensions and wall thickness, grossly normal LV systolic function EF 65-70%, minimal TR, borderline pulmHTN PASP 37mmHg.    Patient subsequently referred to [facility] 11/20/20 s/p cardiac catheterization with successful PTCA to pLAD ISR, residual mLCX 30-50% lesion.     Patient admitted to [facility] for monitoring post procedure.   Patient currently asymptomatic, VSS, right radial access soft, NT, no hematoma, radial pulse 2+. Labs and telemetry reviewed.   Patient now deemed stable for discharge as per Dr. Valdez and to follow-up with Dr. Corado on 11/30/2020.     I was physically present for the key portions of the evaluation and management (E/M) service provided.  I have personally seen and examined this patient.  I have reviewed vitals, labs, medications, cardiac studies and remaining additional imaging.  I agree with the above discharge documentation which I have reviewed and edited where appropriate. Patient is hemodynamically stable and appropriate for discharge home on the above prescribed medications.  Close outpatient cardiology follow up is recommended within 1-2 weeks.    Lia Valdez MD   Cardiology Attending    35 minutes spent on total encounter; more than 50% of the visit was spent counseling and/or coordinating care by the attending physician, with plan of care discussed with the patient and cardiac team.

## 2020-11-21 NOTE — DISCHARGE NOTE NURSING/CASE MANAGEMENT/SOCIAL WORK - PATIENT PORTAL LINK FT
You can access the FollowMyHealth Patient Portal offered by [facility] by registering at the following website: [email or web address]. By joining [facility]’s FollowMyHealth portal, you will also be able to view your health information using other applications (apps) compatible with our system.

## 2020-11-25 PROBLEM — K74.60 UNSPECIFIED CIRRHOSIS OF LIVER: Chronic | Status: ACTIVE | Noted: 2020-11-18

## 2020-11-25 PROBLEM — C22.0 LIVER CELL CARCINOMA: Chronic | Status: ACTIVE | Noted: 2020-11-18

## 2020-12-01 DIAGNOSIS — Z79.84 LONG TERM (CURRENT) USE OF ORAL HYPOGLYCEMIC DRUGS: ICD-10-CM

## 2020-12-01 DIAGNOSIS — I25.10 ATHEROSCLEROTIC HEART DISEASE OF NATIVE CORONARY ARTERY WITHOUT ANGINA PECTORIS: ICD-10-CM

## 2020-12-01 DIAGNOSIS — I10 ESSENTIAL (PRIMARY) HYPERTENSION: ICD-10-CM

## 2020-12-01 DIAGNOSIS — Z94.4 LIVER TRANSPLANT STATUS: ICD-10-CM

## 2020-12-01 DIAGNOSIS — Z95.5 PRESENCE OF CORONARY ANGIOPLASTY IMPLANT AND GRAFT: ICD-10-CM

## 2020-12-01 DIAGNOSIS — I27.20 PULMONARY HYPERTENSION, UNSPECIFIED: ICD-10-CM

## 2020-12-01 DIAGNOSIS — I25.119 ATHEROSCLEROTIC HEART DISEASE OF NATIVE CORONARY ARTERY WITH UNSPECIFIED ANGINA PECTORIS: ICD-10-CM

## 2020-12-01 DIAGNOSIS — Z85.05 PERSONAL HISTORY OF MALIGNANT NEOPLASM OF LIVER: ICD-10-CM

## 2020-12-01 DIAGNOSIS — Z79.82 LONG TERM (CURRENT) USE OF ASPIRIN: ICD-10-CM

## 2020-12-01 DIAGNOSIS — E78.5 HYPERLIPIDEMIA, UNSPECIFIED: ICD-10-CM
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
## 2020-12-01 DIAGNOSIS — T82.855A STENOSIS OF CORONARY ARTERY STENT, INITIAL ENCOUNTER: ICD-10-CM

## 2020-12-01 DIAGNOSIS — E11.9 TYPE 2 DIABETES MELLITUS WITHOUT COMPLICATIONS: ICD-10-CM

## 2020-12-01 DIAGNOSIS — Z87.891 PERSONAL HISTORY OF NICOTINE DEPENDENCE: ICD-10-CM

## 2020-12-03 ENCOUNTER — APPOINTMENT (OUTPATIENT)
Dept: CARDIOLOGY | Facility: CLINIC | Age: 65
End: 2020-12-03
Payer: MEDICARE

## 2020-12-03 VITALS
DIASTOLIC BLOOD PRESSURE: 88 MMHG | TEMPERATURE: 97.2 F | RESPIRATION RATE: 18 BRPM | SYSTOLIC BLOOD PRESSURE: 134 MMHG | HEART RATE: 63 BPM | WEIGHT: 160 LBS | BODY MASS INDEX: 28.34 KG/M2 | OXYGEN SATURATION: 98 %

## 2020-12-03 PROCEDURE — 99495 TRANSJ CARE MGMT MOD F2F 14D: CPT

## 2020-12-03 PROCEDURE — 93000 ELECTROCARDIOGRAM COMPLETE: CPT

## 2020-12-03 PROCEDURE — 99072 ADDL SUPL MATRL&STAF TM PHE: CPT

## 2020-12-03 NOTE — HISTORY OF PRESENT ILLNESS
[FreeTextEntry1] : \par  \par  \par 1. CAD s/p PCI: He underwent cardiac cath at [facility] and was found to have both RCA and LAD stenosis in 6/2015. He underwent PCI and of his LAD again and was discharged on 11/21. He is on ASA and plavix. He reports improvement in CP. ET is stable.\par 2. HTN: on medications, well-controlled. No SE noted.\par 3. Hyperlipidemia: on statin, no muscle pain. \par

## 2020-12-03 NOTE — REASON FOR VISIT
[Follow-Up - Clinic] : a clinic follow-up of [Coronary Artery Disease] : coronary artery disease [Hyperlipidemia] : hyperlipidemia [Hypertension] : hypertension [FreeTextEntry1] : 65 M HTN hyperlipidemia CAD s/p PCI 2014 of LAD and RCA for f/u after hospitalization.

## 2020-12-03 NOTE — DISCUSSION/SUMMARY
[FreeTextEntry1] : 65 M HTN hyperlipidemia CAD s/p PCI for f/u.\par Continue medications for HTN.\par Continue statin.\par ASA and plavix for CAD s/p PCI.\par Hospital and cardiac cath reports reviewed. Patient seen within 14 days of DC.

## 2021-01-02 ENCOUNTER — NON-APPOINTMENT (OUTPATIENT)
Age: 66
End: 2021-01-02

## 2021-02-04 ENCOUNTER — APPOINTMENT (OUTPATIENT)
Dept: CARDIOLOGY | Facility: CLINIC | Age: 66
End: 2021-02-04
Payer: MEDICARE

## 2021-02-04 VITALS
SYSTOLIC BLOOD PRESSURE: 133 MMHG | DIASTOLIC BLOOD PRESSURE: 88 MMHG | BODY MASS INDEX: 27.28 KG/M2 | WEIGHT: 154 LBS | RESPIRATION RATE: 18 BRPM | HEART RATE: 65 BPM | OXYGEN SATURATION: 96 % | TEMPERATURE: 97.6 F

## 2021-02-04 PROCEDURE — 93000 ELECTROCARDIOGRAM COMPLETE: CPT

## 2021-02-04 PROCEDURE — 99214 OFFICE O/P EST MOD 30 MIN: CPT

## 2021-02-04 PROCEDURE — 99072 ADDL SUPL MATRL&STAF TM PHE: CPT

## 2021-02-04 NOTE — PHYSICAL EXAM
[General Appearance - Well Developed] : well developed [Normal Appearance] : normal appearance [Well Groomed] : well groomed [General Appearance - Well Nourished] : well nourished [No Deformities] : no deformities [General Appearance - In No Acute Distress] : no acute distress [Normal Conjunctiva] : the conjunctiva exhibited no abnormalities [Eyelids - No Xanthelasma] : the eyelids demonstrated no xanthelasmas [Normal Oral Mucosa] : normal oral mucosa [No Oral Pallor] : no oral pallor [No Oral Cyanosis] : no oral cyanosis [Normal Jugular Venous A Waves Present] : normal jugular venous A waves present [Normal Jugular Venous V Waves Present] : normal jugular venous V waves present [No Jugular Venous Jansen A Waves] : no jugular venous jansen A waves [Respiration, Rhythm And Depth] : normal respiratory rhythm and effort [Exaggerated Use Of Accessory Muscles For Inspiration] : no accessory muscle use [Auscultation Breath Sounds / Voice Sounds] : lungs were clear to auscultation bilaterally [Heart Rate And Rhythm] : heart rate and rhythm were normal [Heart Sounds] : normal S1 and S2 [Murmurs] : no murmurs present [Abdomen Soft] : soft [Abdomen Tenderness] : non-tender [Abdomen Mass (___ Cm)] : no abdominal mass palpated [Abnormal Walk] : normal gait [Nail Clubbing] : no clubbing of the fingernails [Cyanosis, Localized] : no localized cyanosis [Petechial Hemorrhages (___cm)] : no petechial hemorrhages [Skin Color & Pigmentation] : normal skin color and pigmentation [] : no rash [No Venous Stasis] : no venous stasis [Skin Lesions] : no skin lesions [No Skin Ulcers] : no skin ulcer [No Xanthoma] : no  xanthoma was observed [Oriented To Time, Place, And Person] : oriented to person, place, and time [Affect] : the affect was normal [Mood] : the mood was normal [No Anxiety] : not feeling anxious

## 2021-02-19 NOTE — REVIEW OF SYSTEMS
[see HPI] : see HPI [Shortness Of Breath] : shortness of breath [Dyspnea on exertion] : dyspnea during exertion [Chest Pain] : chest pain [Negative] : Heme/Lymph [Chest  Pressure] : no chest pressure [Palpitations] : no palpitations

## 2021-02-19 NOTE — REASON FOR VISIT
[Follow-Up - Clinic] : a clinic follow-up of [Coronary Artery Disease] : coronary artery disease [Hyperlipidemia] : hyperlipidemia [Hypertension] : hypertension [FreeTextEntry1] : 65 M HTN hyperlipidemia CAD s/p PCI 2014 of LAD and RCA for f/u.\par

## 2021-02-19 NOTE — DISCUSSION/SUMMARY
[FreeTextEntry1] : 65 M HTN hyperlipidemia CAD s/p PCI with pleuritic CP and abnormal CT finding.\par PATIENT STATES HE IS FOR LUNG BIOPSY.\par HE IS AT AN INTERMEDIATE LEVEL OF CARDIAC RISK FOR LOW RISK PROCEDURE.\par STOP ASA AND PLAVIX 5 DAYS PRIOR TO PROCEDURE (Patient notified). \par PLEASE RESUME ONE DAY AFTER PROCEDURE.\par Continue medications for HTN.\par Continue statin.\par

## 2021-02-19 NOTE — HISTORY OF PRESENT ILLNESS
[FreeTextEntry1] :   \par 1. CAD s/p PCI: He underwent cardiac cath at [facility] and was found to have both RCA and LAD stenosis in 6/2015. He underwent PCI and of his LAD again and was discharged on 11/21/20. He is on ASA and plavix.  \par 2. HTN: on medications, compliant with medications.\par 3. Hyperlipidemia: on statin.\par Patient had worsening pleuritic CP mainly on the right side. CT abdomen showed 1/22/21 that showed possible metastatic disease (hx of liver CA).\par He has mild SOB.

## 2021-05-06 ENCOUNTER — APPOINTMENT (OUTPATIENT)
Dept: CARDIOLOGY | Facility: CLINIC | Age: 66
End: 2021-05-06
Payer: MEDICARE

## 2021-05-06 VITALS
OXYGEN SATURATION: 99 % | WEIGHT: 145 LBS | HEART RATE: 76 BPM | BODY MASS INDEX: 25.69 KG/M2 | SYSTOLIC BLOOD PRESSURE: 108 MMHG | DIASTOLIC BLOOD PRESSURE: 71 MMHG | TEMPERATURE: 98.7 F | RESPIRATION RATE: 18 BRPM

## 2021-05-06 PROCEDURE — 99214 OFFICE O/P EST MOD 30 MIN: CPT

## 2021-05-06 PROCEDURE — 93000 ELECTROCARDIOGRAM COMPLETE: CPT

## 2021-05-06 PROCEDURE — 99072 ADDL SUPL MATRL&STAF TM PHE: CPT

## 2021-05-06 NOTE — DISCUSSION/SUMMARY
[FreeTextEntry1] : 65 M HTN hyperlipidemia CAD s/p PCI for f/u.\par Patient undergoing chemotherapy.\par Monitor SOB.\par Continue medications for HTN.\par Continue statin.\par Continue ASA and plavix.

## 2021-05-06 NOTE — HISTORY OF PRESENT ILLNESS
[FreeTextEntry1] :  \par 1. CAD s/p PCI: He underwent cardiac cath at [facility] and was found to have both RCA and LAD stenosis in 6/2015. He underwent PCI of his LAD in 2020. He is on ASA and plavix. \par 2. HTN: on medications, controlled.\par 3. Hyperlipidemia: on statin. No muscle pain is noted.\par Patient had worsening pleuritic CP mainly on the right side. CT abdomen showed 1/22/21 that showed possible metastatic disease (hx of liver CA).\par He is undergoing chemotherapy. He is on Gemzar and cisplatin.\par \par Patient received both doses of his COVID vaccine.\par \par He denies CP but has mild exertional SOB. The patient denies cough or fevers.

## 2021-05-06 NOTE — REASON FOR VISIT
[Hyperlipidemia] : hyperlipidemia [Hypertension] : hypertension [Coronary Artery Disease] : coronary artery disease [FreeTextEntry1] : 65 M HTN hyperlipidemia CAD s/p PCI 2014 of LAD and RCA for f/u.\par \par

## 2021-09-20 ENCOUNTER — APPOINTMENT (OUTPATIENT)
Dept: CARDIOLOGY | Facility: CLINIC | Age: 66
End: 2021-09-20

## 2021-10-11 ENCOUNTER — APPOINTMENT (OUTPATIENT)
Dept: CARDIOLOGY | Facility: CLINIC | Age: 66
End: 2021-10-11
Payer: MEDICARE

## 2021-10-11 VITALS
BODY MASS INDEX: 25.15 KG/M2 | RESPIRATION RATE: 18 BRPM | OXYGEN SATURATION: 98 % | HEART RATE: 73 BPM | WEIGHT: 142 LBS | TEMPERATURE: 97.7 F | DIASTOLIC BLOOD PRESSURE: 78 MMHG | SYSTOLIC BLOOD PRESSURE: 116 MMHG

## 2021-10-11 PROCEDURE — 93000 ELECTROCARDIOGRAM COMPLETE: CPT

## 2021-10-11 PROCEDURE — 99214 OFFICE O/P EST MOD 30 MIN: CPT

## 2021-10-11 NOTE — HISTORY OF PRESENT ILLNESS
[FreeTextEntry1] :  \par \par 1. CAD s/p PCI: He underwent cardiac cath at [facility] and was found to have both RCA and LAD stenosis in 6/2015. He underwent PCI of his LAD in 2020. He is on ASA and plavix. \par 2. HTN: on medications, controlled. Patient is compliant with medications.\par 3. Hyperlipidemia: on statin. \par Patient had worsening pleuritic CP mainly on the right side. CT abdomen showed 1/22/21 that showed possible metastatic disease (hx of liver CA).\par He is undergoing chemotherapy. He is on Gemzar and cisplatin.\par \par Patient received his COVID vaccine and his booster.\par \par Patient received his flu vaccine.\par \par He does not have children.\par \par The patient reports improvement in his CP and SOB.

## 2021-10-11 NOTE — DISCUSSION/SUMMARY
[FreeTextEntry1] : 66 M HTN hyperlipidemia CAD s/p PCI for f/u.\par Continue ASA 81 and plavix 75 QD for CAD s/p PCI.\par Continue medications for HTN.\par Continue statin for hyperlipidemia.\par Patient undergoing chemotherapy.\par Patient received his COVID vaccine.

## 2021-10-11 NOTE — REASON FOR VISIT
[Hyperlipidemia] : hyperlipidemia [Hypertension] : hypertension [Coronary Artery Disease] : coronary artery disease [FreeTextEntry1] : 66 M HTN hyperlipidemia CAD s/p PCI 2014 of LAD and RCA for f/u.\par

## 2021-12-20 ENCOUNTER — APPOINTMENT (OUTPATIENT)
Dept: CARDIOLOGY | Facility: CLINIC | Age: 66
End: 2021-12-20
Payer: MEDICARE

## 2021-12-20 VITALS
HEART RATE: 67 BPM | TEMPERATURE: 97.8 F | RESPIRATION RATE: 18 BRPM | DIASTOLIC BLOOD PRESSURE: 89 MMHG | OXYGEN SATURATION: 98 % | SYSTOLIC BLOOD PRESSURE: 149 MMHG | WEIGHT: 132 LBS | BODY MASS INDEX: 23.38 KG/M2

## 2021-12-20 PROCEDURE — 93000 ELECTROCARDIOGRAM COMPLETE: CPT

## 2021-12-20 PROCEDURE — 99214 OFFICE O/P EST MOD 30 MIN: CPT

## 2021-12-20 NOTE — HISTORY OF PRESENT ILLNESS
[FreeTextEntry1] : \par   \par 1. CAD s/p PCI: He underwent cardiac cath at [facility] and was found to have both RCA and LAD stenosis in 6/2015. He underwent PCI of his LAD in 2020. He is on ASA and plavix. The patient denies CP or SOB.\par No cough or fevers noted.\par 2. HTN: on medications, no SE are noted.\par 3. Hyperlipidemia: on statin. \par CT abdomen showed 1/22/21 that showed possible metastatic disease (hx of liver CA).\par He is undergoing chemotherapy. He is on Gemzar and cisplatin.\par \par Patient received his COVID vaccine and his booster.\par  \par He does not have children.\par \par The patient denies CP or SOB. ET is stable. No cough or fevers.\par  \par

## 2021-12-20 NOTE — DISCUSSION/SUMMARY
[FreeTextEntry1] : 66 M HTN hyperlipidemia CAD s/p PCI for f/u.\par Continue medications for HTN and hyperlipidemia.\par Continue ASA 81 and plavix 75 QD for CAD s/p PCI.\par Continue exercise and diet.\par Patient received his COVID vaccine.

## 2021-12-20 NOTE — REASON FOR VISIT
[Hyperlipidemia] : hyperlipidemia [Hypertension] : hypertension [Coronary Artery Disease] : coronary artery disease [FreeTextEntry1] : 66 M HTN hyperlipidemia CAD s/p PCI 2014 of LAD and RCA for f/u.\par [phone]\par Dr. Rodriguez Dewitt \par Echo

## 2022-04-04 ENCOUNTER — APPOINTMENT (OUTPATIENT)
Dept: CARDIOLOGY | Facility: CLINIC | Age: 67
End: 2022-04-04

## 2023-07-27 NOTE — DISCHARGE NOTE NURSING/CASE MANAGEMENT/SOCIAL WORK - NSTRANSFERBELONGINGSRESP_GEN_A_NUR
Quality 226: Preventive Care And Screening: Tobacco Use: Screening And Cessation Intervention: Patient screened for tobacco use and is an ex/non-smoker
Quality 111:Pneumonia Vaccination Status For Older Adults: Patient received any pneumococcal conjugate or polysaccharide vaccine on or after their 60th birthday and before the end of the measurement period
Detail Level: Detailed
Quality 130: Documentation Of Current Medications In The Medical Record: Current Medications Documented
Quality 431: Preventive Care And Screening: Unhealthy Alcohol Use - Screening: Patient not identified as an unhealthy alcohol user when screened for unhealthy alcohol use using a systematic screening method
Quality 47: Advance Care Plan: Advance Care Planning discussed and documented; advance care plan or surrogate decision maker documented in the medical record.
yes